# Patient Record
Sex: MALE | Race: OTHER | Employment: PART TIME | URBAN - METROPOLITAN AREA
[De-identification: names, ages, dates, MRNs, and addresses within clinical notes are randomized per-mention and may not be internally consistent; named-entity substitution may affect disease eponyms.]

---

## 2019-03-06 ENCOUNTER — HOSPITAL ENCOUNTER (EMERGENCY)
Facility: HOSPITAL | Age: 34
Discharge: HOME | End: 2019-03-06
Payer: OTHER MISCELLANEOUS

## 2019-03-06 VITALS
WEIGHT: 184 LBS | TEMPERATURE: 98 F | HEART RATE: 114 BPM | DIASTOLIC BLOOD PRESSURE: 99 MMHG | HEIGHT: 65 IN | RESPIRATION RATE: 18 BRPM | OXYGEN SATURATION: 97 % | SYSTOLIC BLOOD PRESSURE: 158 MMHG | BODY MASS INDEX: 30.66 KG/M2

## 2019-03-06 DIAGNOSIS — S61.208A AVULSION OF SKIN OF INDEX FINGER, INITIAL ENCOUNTER: Primary | ICD-10-CM

## 2019-03-06 PROCEDURE — 99283 EMERGENCY DEPT VISIT LOW MDM: CPT

## 2019-03-06 PROCEDURE — 90471 IMMUNIZATION ADMIN: CPT

## 2019-03-06 RX ORDER — IBUPROFEN 600 MG/1
600 TABLET ORAL ONCE
Status: COMPLETED | OUTPATIENT
Start: 2019-03-06 | End: 2019-03-06

## 2019-03-06 NOTE — ED PROVIDER NOTES
Patient Seen in: [facility] Emergency Department    History   Patient presents with:  Laceration Abrasion (integumentary)    Stated Complaint:     HPI    54-year-old male presents to the emergency department the laceration to his left index finger resolved        MDM   Tetanus was updated Surgicel dressing applied advised PMD hand specialty follow-up 2 days for re-check  Discussed monitoring for infection and return for concerns        Disposition and Plan     Clinical Impression:  Avulsion of skin

## 2019-03-06 NOTE — ED INITIAL ASSESSMENT (HPI)
Pt reports a laceration to left index finger about 15 mins. Pt denies numbness or tingling.  Cms intact to ext

## 2019-03-06 NOTE — ED NOTES
Pt states his was cutting onions at work approx 15 minutes PTA, and cut off the tip of his index finger. States was a straight knife. Wound is actively bleeding. Pt is a/o x 4. Wound was cleaned and gauze applied.

## (undated) NOTE — ED AVS SNAPSHOT
John Levine   MRN: [id number]    Department:  [facility] Emergency Department   Date of Visit:  3/6/2019           Disclosure     Insurance plans vary and the physician(s) referred by the ER may not be covered by your plan.  Please cont CARE PHYSICIAN AT ONCE OR RETURN IMMEDIATELY TO THE EMERGENCY DEPARTMENT. If you have been prescribed any medication(s), please fill your prescription right away and begin taking the medication(s) as directed.   If you believe that any of the medications